# Patient Record
(demographics unavailable — no encounter records)

---

## 2024-12-27 NOTE — HISTORY OF PRESENT ILLNESS
[FreeTextEntry1] :  44 year old male patient referred for further workup of microhematuria. He was noted to have trace blood in his urine twice. He has nocturia x2-3, but he has fairly severe sleep apnea. He wakes up and feels he has to go to the bathroom. daytime frequency 6-8x/day. HIs stream is good. Denies urgency or urge incontinence. Denies history of stones or UTIs. not bothered by urination. Denies history of gross hematuria  PMHx: gallstones, sleep apnea,  hypertension PSHx: arthroscopic knee surgery, awaiting cholecystectomy ALG:  No known drug allergies FHx: + stone disease, + diabetes mellitus SHx: Tobacco use:  1 pack per day for the last 20 years or so, still smoking, denies sign alcohol use  BUN 10  CRT 0.64 Trace blood and protein in his urine, however he has 0-2 RBC in urine, thus not meeting the criteria for true microhematuria and this is from 2/26/24

## 2024-12-27 NOTE — ADDENDUM
[FreeTextEntry1] :  PRAVIN DE JESUS, am scribing for and in the presence of  in the following sections: HISTORY OF PRESENT ILLNESS, PAST MEDICAL/FAMILY/SOCIAL HISTORY, REVIEW OF SYSTEMS, VITAL SIGNS, PHYSICAL EXAM, ASSESSMENT/PLAN on 12/27/2024.

## 2024-12-27 NOTE — ASSESSMENT
[FreeTextEntry1] :  44 year old male patient with 1) urinary frequency; 2) microhematuria but only on dipstick, not on micro; 3) nocturia, but not particularly bothersome.  We discussed these issues at length.. He would also like to have a PSA done and as he has some frequency, and we will order that. I have ordered a new microscopic urinalysis and renal and bladder ultrasound. If this is fine, then I don't see a need currently for cystoscopy although I discussed he should really quit smoking as it puts him at higher risk for bladder cancer. We discussed that if he has over 3 RBCs then he would need cystoscopy. All of this was discussed in detail.  All of the patient's questions were otherwise answered. He will follow-up after renal and bladder ultrasound and we will go over the results with him.    The submitted E/M billing level for this visit reflects the total time spent on the day of the visit including face-to-face time spent with the patient, non-face-to-face review of medical records and relevant information, documentation, and asynchronous communication with the patient after a visit via phone, email, or patients EHR portal after the visit. The medical records reviewed are either scanned into the chart or reviewed with the patient using a patients electronic medical records portal for patients with records not available to [facility] via electronic transmission platforms from other institutions and labs. Time spent counseling and performing coordination of care was also included in determining the appropriate EM billing level.   I have reviewed and verified information regarding the chief complaint and history recorded by the ancillary staff and/or the patient. I have independently reviewed and interpreted tests performed by other physicians and facilities as necessary.   I have discussed with the patient differential diagnosis, reason for auxiliary tests if ordered, risks, benefits, alternatives, and complications of each form of therapy were discussed.  I personally performed the services described in the documentation, reviewed the documentation recorded by the scribe in my presence, and it accurately and completely records my words and actions.